# Patient Record
Sex: FEMALE | Race: WHITE | Employment: STUDENT | URBAN - NONMETROPOLITAN AREA
[De-identification: names, ages, dates, MRNs, and addresses within clinical notes are randomized per-mention and may not be internally consistent; named-entity substitution may affect disease eponyms.]

---
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
## 2025-01-30 ENCOUNTER — OFFICE VISIT (OUTPATIENT)
Dept: PRIMARY CARE CLINIC | Age: 20
End: 2025-01-30

## 2025-01-30 VITALS
HEART RATE: 122 BPM | WEIGHT: 188 LBS | BODY MASS INDEX: 35.52 KG/M2 | TEMPERATURE: 101.3 F | RESPIRATION RATE: 20 BRPM | DIASTOLIC BLOOD PRESSURE: 60 MMHG | SYSTOLIC BLOOD PRESSURE: 116 MMHG | OXYGEN SATURATION: 98 %

## 2025-01-30 DIAGNOSIS — J10.1 INFLUENZA A: ICD-10-CM

## 2025-01-30 DIAGNOSIS — R50.9 FEVER, UNSPECIFIED FEVER CAUSE: ICD-10-CM

## 2025-01-30 DIAGNOSIS — H66.001 NON-RECURRENT ACUTE SUPPURATIVE OTITIS MEDIA OF RIGHT EAR WITHOUT SPONTANEOUS RUPTURE OF TYMPANIC MEMBRANE: Primary | ICD-10-CM

## 2025-01-30 LAB
INFLUENZA A ANTIGEN, POC: POSITIVE
INFLUENZA B ANTIGEN, POC: NEGATIVE
LOT EXPIRE DATE: ABNORMAL
LOT KIT NUMBER: ABNORMAL
SARS-COV-2, POC: ABNORMAL
VALID INTERNAL CONTROL: ABNORMAL
VENDOR AND KIT NAME POC: ABNORMAL

## 2025-01-30 RX ORDER — AMOXICILLIN 500 MG/1
500 CAPSULE ORAL 2 TIMES DAILY
Qty: 20 CAPSULE | Refills: 0 | Status: SHIPPED | OUTPATIENT
Start: 2025-01-30 | End: 2025-02-09

## 2025-01-30 ASSESSMENT — ENCOUNTER SYMPTOMS
DIARRHEA: 0
WHEEZING: 0
SORE THROAT: 0
CHOKING: 0
SHORTNESS OF BREATH: 0
NAUSEA: 0
TROUBLE SWALLOWING: 0
VOMITING: 0
SINUS PRESSURE: 1
CHEST TIGHTNESS: 0
COUGH: 1

## 2025-01-30 NOTE — PROGRESS NOTES
[facility]  [address]  [facility] [address]  Dept: [phone]  Dept Fax: [phone]    Cristal Collier  is a 19 y.o. female who presents today for her medical conditions/complaints as noted below.  Cristal Collier is c/o of   Chief Complaint   Patient presents with    Ear Pain     In left side of ear, started this morning        HPI:     History of Present Illness  The patient is a 19-year-old female who presents today for ear pain.    She reports an episode of feeling unwell after returning from work yesterday, which led to her sleeping. Upon awakening, she experienced a general sense of malaise and warmth. This morning, she noticed hearing loss in one ear accompanied by pain that has since radiated throughout her body, including her back. She also reports unilateral throat pain and a severe nonproductive cough. She does not experience any shortness of breath. Her appetite has been poor over the past two days, and she had a headache yesterday. She works at a  center where there was a recent outbreak of hand, foot, and mouth disease, although neither she nor her child contracted it. She has not received the influenza vaccine this year.        History reviewed. No pertinent past medical history.  Past Surgical History:   Procedure Laterality Date    TEAR DUCT SURGERY Bilateral 2006    TONSILLECTOMY [facility] ADENOIDECTOMY  08/06/2010    [facility]       Family History   Problem Relation Age of Onset    Glaucoma Neg Hx        Social History     Tobacco Use    Smoking status: Never    Smokeless tobacco: Never   Substance Use Topics    Alcohol use: No      Prior to Visit Medications    Not on File     No Known Allergies    Subjective:      Review of Systems   Constitutional:  Positive for appetite change, fatigue and fever (tmax 101.3). Negative for activity change